# Patient Record
Sex: MALE | Race: OTHER | NOT HISPANIC OR LATINO
[De-identification: names, ages, dates, MRNs, and addresses within clinical notes are randomized per-mention and may not be internally consistent; named-entity substitution may affect disease eponyms.]

---

## 2019-07-08 ENCOUNTER — APPOINTMENT (OUTPATIENT)
Dept: HEART AND VASCULAR | Facility: CLINIC | Age: 59
End: 2019-07-08
Payer: COMMERCIAL

## 2019-07-08 VITALS
TEMPERATURE: 98.7 F | SYSTOLIC BLOOD PRESSURE: 130 MMHG | WEIGHT: 189 LBS | HEIGHT: 71 IN | HEART RATE: 80 BPM | BODY MASS INDEX: 26.46 KG/M2 | RESPIRATION RATE: 14 BRPM | DIASTOLIC BLOOD PRESSURE: 70 MMHG | OXYGEN SATURATION: 97 %

## 2019-07-08 DIAGNOSIS — Z80.8 FAMILY HISTORY OF MALIGNANT NEOPLASM OF OTHER ORGANS OR SYSTEMS: ICD-10-CM

## 2019-07-08 DIAGNOSIS — N20.0 CALCULUS OF KIDNEY: ICD-10-CM

## 2019-07-08 DIAGNOSIS — Z83.3 FAMILY HISTORY OF DIABETES MELLITUS: ICD-10-CM

## 2019-07-08 PROCEDURE — 93306 TTE W/DOPPLER COMPLETE: CPT

## 2019-07-08 PROCEDURE — 99203 OFFICE O/P NEW LOW 30 MIN: CPT

## 2019-07-08 PROCEDURE — 93000 ELECTROCARDIOGRAM COMPLETE: CPT

## 2019-07-08 NOTE — DISCUSSION/SUMMARY
[PVCs] : ectopic ventricular beats [Echocardiogram] : an echocardiogram [___ Day(s)] : [unfilled] day(s) [With Me] : with me [FreeTextEntry1] : echocardiogram results reviewed with patient: normal  LVEF and wall motion without significant valvular pathology  or evidence of pulmonary hypertension \par \par Advised to have holter monitor placed  later this week.  \par \par After holter results returned,  will start magnesium oxide 400mg daily and assess response\par \par Continue to decrease alcohol intake ,  limit 5 drinks per week

## 2019-07-08 NOTE — PHYSICAL EXAM
[General Appearance - Well Developed] : well developed [Normal Appearance] : normal appearance [Well Groomed] : well groomed [General Appearance - Well Nourished] : well nourished [No Deformities] : no deformities [General Appearance - In No Acute Distress] : no acute distress [Normal Conjunctiva] : the conjunctiva exhibited no abnormalities [Eyelids - No Xanthelasma] : the eyelids demonstrated no xanthelasmas [No Oral Cyanosis] : no oral cyanosis [Normal Jugular Venous V Waves Present] : normal jugular venous V waves present [Normal Jugular Venous A Waves Present] : normal jugular venous A waves present [No Jugular Venous Vera A Waves] : no jugular venous vera A waves [Heart Sounds] : normal S1 and S2 [Heart Rate And Rhythm] : heart rate and rhythm were normal [Murmurs] : no murmurs present [Arterial Pulses Normal] : the arterial pulses were normal [Edema] : no peripheral edema present [FreeTextEntry1] : normal capillary refill  [Respiration, Rhythm And Depth] : normal respiratory rhythm and effort [Exaggerated Use Of Accessory Muscles For Inspiration] : no accessory muscle use [Auscultation Breath Sounds / Voice Sounds] : lungs were clear to auscultation bilaterally [Bowel Sounds] : normal bowel sounds [Abdomen Soft] : soft [Abdomen Tenderness] : non-tender [Abdomen Mass (___ Cm)] : no abdominal mass palpated [Abnormal Walk] : normal gait [Gait - Sufficient For Exercise Testing] : the gait was sufficient for exercise testing [Nail Clubbing] : no clubbing of the fingernails [Cyanosis, Localized] : no localized cyanosis [Petechial Hemorrhages (___cm)] : no petechial hemorrhages [] : no ischemic changes [Skin Color & Pigmentation] : normal skin color and pigmentation [Skin Turgor] : normal skin turgor [No Venous Stasis] : no venous stasis [No Xanthoma] : no  xanthoma was observed [Oriented To Time, Place, And Person] : oriented to person, place, and time [Impaired Insight] : insight and judgment were intact [Affect] : the affect was normal [Mood] : the mood was normal

## 2019-07-08 NOTE — HISTORY OF PRESENT ILLNESS
[FreeTextEntry1] : EKG today shows NSR 79 bpm  with frequent VPCs conducting with LBBB morphology , varying coupling intervals . No ischemia or LVH . Normal axis and intervals \par \par Drinks at least 10 drinks per week,  does not hydrate well  most days \par \par Recent lipid status  in good order , LFTs normal \par \par \par \par

## 2019-07-08 NOTE — REASON FOR VISIT
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 Stopped Claritin D recently  and denies use of stimulants or excessive caffeine intake . No c/o chest pain, syncope, orthopnea, PND, edema . No  family hx of premature CAD.  \par \par Non smoker \par \par

## 2019-07-08 NOTE — REVIEW OF SYSTEMS
[Palpitations] : palpitations [Confusion] : no confusion was observed [Memory Lapses Or Loss] : no memory lapses or loss [Depression] : no depression [Anxiety] : anxiety [Under Stress] : under stress [Suicidal] : not suicidal [Negative] : Heme/Lymph

## 2019-07-08 NOTE — PHYSICAL EXAM
[General Appearance - Well Developed] : well developed [Normal Appearance] : normal appearance [Well Groomed] : well groomed [General Appearance - Well Nourished] : well nourished [No Deformities] : no deformities [General Appearance - In No Acute Distress] : no acute distress [Normal Conjunctiva] : the conjunctiva exhibited no abnormalities [Eyelids - No Xanthelasma] : the eyelids demonstrated no xanthelasmas [No Oral Cyanosis] : no oral cyanosis [Normal Jugular Venous V Waves Present] : normal jugular venous V waves present [Normal Jugular Venous A Waves Present] : normal jugular venous A waves present [No Jugular Venous Vera A Waves] : no jugular venous vera A waves [Heart Rate And Rhythm] : heart rate and rhythm were normal [Heart Sounds] : normal S1 and S2 [Murmurs] : no murmurs present [Arterial Pulses Normal] : the arterial pulses were normal [Edema] : no peripheral edema present [FreeTextEntry1] : normal capillary refill  [Respiration, Rhythm And Depth] : normal respiratory rhythm and effort [Exaggerated Use Of Accessory Muscles For Inspiration] : no accessory muscle use [Auscultation Breath Sounds / Voice Sounds] : lungs were clear to auscultation bilaterally [Bowel Sounds] : normal bowel sounds [Abdomen Soft] : soft [Abdomen Tenderness] : non-tender [Abdomen Mass (___ Cm)] : no abdominal mass palpated [Abnormal Walk] : normal gait [Gait - Sufficient For Exercise Testing] : the gait was sufficient for exercise testing [Nail Clubbing] : no clubbing of the fingernails [Cyanosis, Localized] : no localized cyanosis [Petechial Hemorrhages (___cm)] : no petechial hemorrhages [] : no ischemic changes [Skin Color & Pigmentation] : normal skin color and pigmentation [Skin Turgor] : normal skin turgor [No Venous Stasis] : no venous stasis [No Xanthoma] : no  xanthoma was observed [Oriented To Time, Place, And Person] : oriented to person, place, and time [Impaired Insight] : insight and judgment were intact [Affect] : the affect was normal [Mood] : the mood was normal

## 2019-07-10 ENCOUNTER — APPOINTMENT (OUTPATIENT)
Dept: HEART AND VASCULAR | Facility: CLINIC | Age: 59
End: 2019-07-10
Payer: COMMERCIAL

## 2019-07-10 VITALS
DIASTOLIC BLOOD PRESSURE: 72 MMHG | BODY MASS INDEX: 26.88 KG/M2 | RESPIRATION RATE: 14 BRPM | HEIGHT: 71 IN | WEIGHT: 192 LBS | OXYGEN SATURATION: 96 % | TEMPERATURE: 98.6 F | HEART RATE: 86 BPM | SYSTOLIC BLOOD PRESSURE: 120 MMHG

## 2019-07-10 DIAGNOSIS — F41.9 ANXIETY DISORDER, UNSPECIFIED: ICD-10-CM

## 2019-07-10 PROCEDURE — 93224 XTRNL ECG REC UP TO 48 HRS: CPT

## 2019-07-10 RX ORDER — ALPRAZOLAM 0.25 MG/1
0.25 TABLET ORAL TWICE DAILY
Qty: 20 | Refills: 0 | Status: ACTIVE | COMMUNITY
Start: 2019-07-10 | End: 1900-01-01

## 2019-08-07 ENCOUNTER — APPOINTMENT (OUTPATIENT)
Dept: HEART AND VASCULAR | Facility: CLINIC | Age: 59
End: 2019-08-07
Payer: COMMERCIAL

## 2019-08-07 DIAGNOSIS — I49.3 VENTRICULAR PREMATURE DEPOLARIZATION: ICD-10-CM

## 2019-08-07 PROCEDURE — 93351 STRESS TTE COMPLETE: CPT

## 2020-03-05 ENCOUNTER — FORM ENCOUNTER (OUTPATIENT)
Age: 60
End: 2020-03-05

## 2020-03-06 ENCOUNTER — OUTPATIENT (OUTPATIENT)
Dept: OUTPATIENT SERVICES | Facility: HOSPITAL | Age: 60
LOS: 1 days | End: 2020-03-06
Payer: COMMERCIAL

## 2020-03-06 ENCOUNTER — APPOINTMENT (OUTPATIENT)
Dept: HEART AND VASCULAR | Facility: CLINIC | Age: 60
End: 2020-03-06
Payer: COMMERCIAL

## 2020-03-06 VITALS
DIASTOLIC BLOOD PRESSURE: 88 MMHG | SYSTOLIC BLOOD PRESSURE: 124 MMHG | WEIGHT: 195 LBS | HEIGHT: 71 IN | BODY MASS INDEX: 27.3 KG/M2 | OXYGEN SATURATION: 99 % | RESPIRATION RATE: 16 BRPM | HEART RATE: 62 BPM | TEMPERATURE: 97.6 F

## 2020-03-06 DIAGNOSIS — R00.1 BRADYCARDIA, UNSPECIFIED: ICD-10-CM

## 2020-03-06 DIAGNOSIS — I49.3 VENTRICULAR PREMATURE DEPOLARIZATION: ICD-10-CM

## 2020-03-06 PROCEDURE — 71046 X-RAY EXAM CHEST 2 VIEWS: CPT

## 2020-03-06 PROCEDURE — 99213 OFFICE O/P EST LOW 20 MIN: CPT

## 2020-03-06 PROCEDURE — 36415 COLL VENOUS BLD VENIPUNCTURE: CPT

## 2020-03-06 PROCEDURE — 71046 X-RAY EXAM CHEST 2 VIEWS: CPT | Mod: 26

## 2020-03-06 PROCEDURE — 93000 ELECTROCARDIOGRAM COMPLETE: CPT | Mod: NC

## 2020-03-07 PROBLEM — I49.3 PREMATURE VENTRICULAR CONTRACTION: Status: ACTIVE | Noted: 2019-07-08

## 2020-03-07 PROBLEM — R00.1 SINUS BRADYCARDIA: Status: ACTIVE | Noted: 2020-03-07

## 2020-03-07 LAB
ALBUMIN SERPL ELPH-MCNC: 4.6 G/DL
ALP BLD-CCNC: 68 U/L
ALT SERPL-CCNC: 21 U/L
ANION GAP SERPL CALC-SCNC: 16 MMOL/L
APPEARANCE: CLEAR
APTT BLD: 33.4 SEC
AST SERPL-CCNC: 17 U/L
BASOPHILS # BLD AUTO: 0.05 K/UL
BASOPHILS NFR BLD AUTO: 0.7 %
BILIRUB SERPL-MCNC: 1.1 MG/DL
BILIRUBIN URINE: NEGATIVE
BLOOD URINE: NEGATIVE
BUN SERPL-MCNC: 14 MG/DL
CALCIUM SERPL-MCNC: 9.9 MG/DL
CHLORIDE SERPL-SCNC: 100 MMOL/L
CHOLEST SERPL-MCNC: 182 MG/DL
CHOLEST/HDLC SERPL: 3.3 RATIO
CO2 SERPL-SCNC: 23 MMOL/L
COLOR: NORMAL
CREAT SERPL-MCNC: 1.01 MG/DL
EOSINOPHIL # BLD AUTO: 0.13 K/UL
EOSINOPHIL NFR BLD AUTO: 1.8 %
ESTIMATED AVERAGE GLUCOSE: 108 MG/DL
GLUCOSE QUALITATIVE U: NEGATIVE
GLUCOSE SERPL-MCNC: 83 MG/DL
HBA1C MFR BLD HPLC: 5.4 %
HCT VFR BLD CALC: 48.1 %
HDLC SERPL-MCNC: 55 MG/DL
HGB BLD-MCNC: 15.2 G/DL
IMM GRANULOCYTES NFR BLD AUTO: 0.3 %
INR PPP: 0.92 RATIO
KETONES URINE: NEGATIVE
LDLC SERPL CALC-MCNC: 88 MG/DL
LEUKOCYTE ESTERASE URINE: NEGATIVE
LYMPHOCYTES # BLD AUTO: 1.75 K/UL
LYMPHOCYTES NFR BLD AUTO: 24.6 %
MAN DIFF?: NORMAL
MCHC RBC-ENTMCNC: 30.7 PG
MCHC RBC-ENTMCNC: 31.6 GM/DL
MCV RBC AUTO: 97.2 FL
MONOCYTES # BLD AUTO: 0.72 K/UL
MONOCYTES NFR BLD AUTO: 10.1 %
NEUTROPHILS # BLD AUTO: 4.44 K/UL
NEUTROPHILS NFR BLD AUTO: 62.5 %
NITRITE URINE: NEGATIVE
PH URINE: 7
PLATELET # BLD AUTO: 238 K/UL
POTASSIUM SERPL-SCNC: 4.4 MMOL/L
PROT SERPL-MCNC: 6.8 G/DL
PROTEIN URINE: NEGATIVE
PT BLD: 10.6 SEC
RBC # BLD: 4.95 M/UL
RBC # FLD: 12.6 %
SODIUM SERPL-SCNC: 139 MMOL/L
SPECIFIC GRAVITY URINE: 1.01
TRIGL SERPL-MCNC: 196 MG/DL
TSH SERPL-ACNC: 1.65 UIU/ML
UROBILINOGEN URINE: NORMAL
WBC # FLD AUTO: 7.11 K/UL

## 2020-03-07 NOTE — HISTORY OF PRESENT ILLNESS
[Preoperative Visit] : for a medical evaluation prior to surgery [Scheduled Procedure ___] : a [unfilled] [Date of Surgery ___] : on [unfilled] [Surgeon Name ___] : surgeon: [unfilled] [Fever] : no fever [Chills] : no chills [Fatigue] : no fatigue [Chest Pain] : no chest pain [Cough] : no cough [Dyspnea] : no dyspnea [Dysuria] : no dysuria [Urinary Frequency] : no urinary frequency [Nausea] : no nausea [Vomiting] : no vomiting [Diarrhea] : no diarrhea [Abdominal Pain] : no abdominal pain [Easy Bruising] : no easy bruising [Lower Extremity Swelling] : no lower extremity swelling [Poor Exercise Tolerance] : no poor exercise tolerance [Diabetes] : no diabetes [Cardiovascular Disease] : no cardiovascular disease [Pulmonary Disease] : no pulmonary disease [Anti-Platelet Agents] : no anti-platelet agents [Nicotine Dependence] : no nicotine dependence [Alcohol Use] : alcohol use [Renal Disease] : no renal disease [GI Disease] : no gastrointestinal disease [Sleep Apnea] : no sleep apnea [Thromboembolic Problems] : no thromboembolic problems [Frequent use of NSAIDs] : no use of NSAIDs [Transfusion Reaction] : no transfusion reaction [Impaired Immunity] : no impaired immunity [Steroid Use in Last 6 Months] : no steroid use in the last six months [Frequent Aspirin Use] : no frequent aspirin use [Prior Anesthesia] : Prior anesthesia [Prev Anesthesia Reaction] : no previous anesthesia reaction [Electrocardiogram] : ~T an ECG ~C was performed [Echocardiogram] : ~T an echocardiogram ~C was performed [Cardiovascular Stress Test] : a cardiac stress test ~T ~C was performed [Good] : Good [Anesthesia Reaction] : no anesthesia reaction [Sudden Death] : no sudden death [Clotting Disorder] : no clotting disorder [Bleeding Disorder] : no bleeding disorder [FreeTextEntry1] : 60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  He has excellent exercise capacity with normal stress echocardiogram and is responding to  metoprolol . \par \par There is no hx of MI, CHF, syncope, strokes , asthma \par \par S/p recent echocardiogram, large gallstones were identified .  He has mild gastritis on EGD . He does have symptoms of post prandial bloating and abdominal /upper back discomfort   but no documented pancreatitis . \par \par EKG shows sinus bradycardia 58 bpm without ectopy, ischemia or LVH

## 2020-03-07 NOTE — PHYSICAL EXAM
[General Appearance - Well Developed] : well developed [Normal Appearance] : normal appearance [Well Groomed] : well groomed [General Appearance - Well Nourished] : well nourished [No Deformities] : no deformities [General Appearance - In No Acute Distress] : no acute distress [Normal Conjunctiva] : the conjunctiva exhibited no abnormalities [Eyelids - No Xanthelasma] : the eyelids demonstrated no xanthelasmas [No Oral Cyanosis] : no oral cyanosis [Normal Jugular Venous A Waves Present] : normal jugular venous A waves present [Normal Jugular Venous V Waves Present] : normal jugular venous V waves present [No Jugular Venous Vera A Waves] : no jugular venous vera A waves [Respiration, Rhythm And Depth] : normal respiratory rhythm and effort [Exaggerated Use Of Accessory Muscles For Inspiration] : no accessory muscle use [Auscultation Breath Sounds / Voice Sounds] : lungs were clear to auscultation bilaterally [Heart Rate And Rhythm] : heart rate and rhythm were normal [Heart Sounds] : normal S1 and S2 [Murmurs] : no murmurs present [Arterial Pulses Normal] : the arterial pulses were normal [Edema] : no peripheral edema present [FreeTextEntry1] : normal capillary refill  [Abnormal Walk] : normal gait [Gait - Sufficient For Exercise Testing] : the gait was sufficient for exercise testing [Nail Clubbing] : no clubbing of the fingernails [Cyanosis, Localized] : no localized cyanosis [Petechial Hemorrhages (___cm)] : no petechial hemorrhages [] : no ischemic changes [Skin Color & Pigmentation] : normal skin color and pigmentation [Skin Turgor] : normal skin turgor [No Venous Stasis] : no venous stasis [No Xanthoma] : no  xanthoma was observed [Oriented To Time, Place, And Person] : oriented to person, place, and time [Impaired Insight] : insight and judgment were intact [Affect] : the affect was normal [Mood] : the mood was normal

## 2020-06-10 ENCOUNTER — RX RENEWAL (OUTPATIENT)
Age: 60
End: 2020-06-10

## 2021-03-07 ENCOUNTER — RX RENEWAL (OUTPATIENT)
Age: 61
End: 2021-03-07

## 2021-05-05 ENCOUNTER — APPOINTMENT (OUTPATIENT)
Dept: HEART AND VASCULAR | Facility: CLINIC | Age: 61
End: 2021-05-05
Payer: COMMERCIAL

## 2021-05-05 VITALS
OXYGEN SATURATION: 97 % | BODY MASS INDEX: 26.18 KG/M2 | SYSTOLIC BLOOD PRESSURE: 120 MMHG | TEMPERATURE: 97.1 F | WEIGHT: 187 LBS | HEART RATE: 69 BPM | RESPIRATION RATE: 15 BRPM | DIASTOLIC BLOOD PRESSURE: 72 MMHG | HEIGHT: 71 IN

## 2021-05-05 DIAGNOSIS — E78.1 PURE HYPERGLYCERIDEMIA: ICD-10-CM

## 2021-05-05 DIAGNOSIS — Z00.00 ENCOUNTER FOR GENERAL ADULT MEDICAL EXAMINATION W/OUT ABNORMAL FINDINGS: ICD-10-CM

## 2021-05-05 DIAGNOSIS — R00.2 PALPITATIONS: ICD-10-CM

## 2021-05-05 PROCEDURE — 93000 ELECTROCARDIOGRAM COMPLETE: CPT

## 2021-05-05 PROCEDURE — 36415 COLL VENOUS BLD VENIPUNCTURE: CPT

## 2021-05-05 PROCEDURE — 99072 ADDL SUPL MATRL&STAF TM PHE: CPT

## 2021-05-05 PROCEDURE — 99396 PREV VISIT EST AGE 40-64: CPT

## 2021-05-05 RX ORDER — ZOSTER VACCINE RECOMBINANT, ADJUVANTED 50 MCG/0.5
50 KIT INTRAMUSCULAR
Qty: 1 | Refills: 1 | Status: ACTIVE | COMMUNITY
Start: 2021-05-05 | End: 1900-01-01

## 2021-05-06 LAB
25(OH)D3 SERPL-MCNC: 23.7 NG/ML
ALBUMIN SERPL ELPH-MCNC: 4.6 G/DL
ALP BLD-CCNC: 76 U/L
ALT SERPL-CCNC: 16 U/L
ANION GAP SERPL CALC-SCNC: 11 MMOL/L
APPEARANCE: CLEAR
AST SERPL-CCNC: 18 U/L
BASOPHILS # BLD AUTO: 0.02 K/UL
BASOPHILS NFR BLD AUTO: 0.3 %
BILIRUB SERPL-MCNC: 1 MG/DL
BILIRUBIN URINE: NEGATIVE
BLOOD URINE: NEGATIVE
BUN SERPL-MCNC: 14 MG/DL
CALCIUM SERPL-MCNC: 9.7 MG/DL
CHLORIDE SERPL-SCNC: 105 MMOL/L
CHOLEST SERPL-MCNC: 202 MG/DL
CO2 SERPL-SCNC: 24 MMOL/L
COLOR: NORMAL
COVID-19 NUCLEOCAPSID  GAM ANTIBODY INTERPRETATION: NEGATIVE
COVID-19 SPIKE DOMAIN ANTIBODY INTERPRETATION: POSITIVE
CREAT SERPL-MCNC: 0.98 MG/DL
CREAT SPEC-SCNC: 149 MG/DL
EOSINOPHIL # BLD AUTO: 0.1 K/UL
EOSINOPHIL NFR BLD AUTO: 1.4 %
ESTIMATED AVERAGE GLUCOSE: 108 MG/DL
GLUCOSE QUALITATIVE U: NEGATIVE
GLUCOSE SERPL-MCNC: 89 MG/DL
HBA1C MFR BLD HPLC: 5.4 %
HCT VFR BLD CALC: 50.2 %
HDLC SERPL-MCNC: 62 MG/DL
HGB BLD-MCNC: 15.7 G/DL
IMM GRANULOCYTES NFR BLD AUTO: 0.4 %
KETONES URINE: NEGATIVE
LDLC SERPL CALC-MCNC: 116 MG/DL
LEUKOCYTE ESTERASE URINE: NEGATIVE
LYMPHOCYTES # BLD AUTO: 1.62 K/UL
LYMPHOCYTES NFR BLD AUTO: 23.1 %
MAN DIFF?: NORMAL
MCHC RBC-ENTMCNC: 29.5 PG
MCHC RBC-ENTMCNC: 31.3 GM/DL
MCV RBC AUTO: 94.2 FL
MICROALBUMIN 24H UR DL<=1MG/L-MCNC: <1.2 MG/DL
MICROALBUMIN/CREAT 24H UR-RTO: NORMAL MG/G
MONOCYTES # BLD AUTO: 0.71 K/UL
MONOCYTES NFR BLD AUTO: 10.1 %
NEUTROPHILS # BLD AUTO: 4.54 K/UL
NEUTROPHILS NFR BLD AUTO: 64.7 %
NITRITE URINE: NEGATIVE
NONHDLC SERPL-MCNC: 140 MG/DL
PH URINE: 5.5
PLATELET # BLD AUTO: 246 K/UL
POTASSIUM SERPL-SCNC: 4.6 MMOL/L
PROT SERPL-MCNC: 7.1 G/DL
PROTEIN URINE: NEGATIVE
PSA FREE FLD-MCNC: 21 %
PSA FREE SERPL-MCNC: 0.39 NG/ML
PSA SERPL-MCNC: 1.84 NG/ML
RBC # BLD: 5.33 M/UL
RBC # FLD: 12.6 %
SARS-COV-2 AB SERPL IA-ACNC: >250 U/ML
SARS-COV-2 AB SERPL QL IA: 0.08 INDEX
SODIUM SERPL-SCNC: 140 MMOL/L
SPECIFIC GRAVITY URINE: 1.02
TRIGL SERPL-MCNC: 124 MG/DL
TSH SERPL-ACNC: 1.37 UIU/ML
UROBILINOGEN URINE: NORMAL
WBC # FLD AUTO: 7.02 K/UL

## 2021-05-25 PROBLEM — Z00.00 ANNUAL PHYSICAL EXAM: Status: ACTIVE | Noted: 2021-05-25

## 2021-05-25 NOTE — PLAN
[FreeTextEntry1] : venipuncture performed with Ciovid Ab, PSA,  lipids,  vitamin D ,etc\par \par advise  robotic assisted cholecystectomy \par \par rx for Shingrix provided \par \par encouraged regular activity and   sensible, low fat diet

## 2021-05-25 NOTE — HEALTH RISK ASSESSMENT
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

## 2021-05-25 NOTE — REVIEW OF SYSTEMS
[Recent Change In Weight] : ~T recent weight change [Anxiety] : anxiety [Negative] : Heme/Lymph [FreeTextEntry2] : 8 lb weight loss on diet

## 2021-05-25 NOTE — HISTORY OF PRESENT ILLNESS
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   He has completed  two doses of Moderna covid vaccine.   No personal hx of covid infection. Past year was very stressful with his wife suffering from endocarditis complicated by strokes and requiring  heart surgery for debridement of valve and prolonged antibiotics. \par \par He is eating a high fiber diet and has not had flare ups of cholecystitis . Prefers to wait until end of summer before having  gallbladder surgery. \par \par EKG shows  NSR 63 bpm without ectopy, ischemia or LVH   QTc 413 msec

## 2021-05-25 NOTE — PHYSICAL EXAM
[No Acute Distress] : no acute distress [Well Nourished] : well nourished [Well Developed] : well developed [Well-Appearing] : well-appearing [Normal Sclera/Conjunctiva] : normal sclera/conjunctiva [PERRL] : pupils equal round and reactive to light [EOMI] : extraocular movements intact [Normal Outer Ear/Nose] : the outer ears and nose were normal in appearance [Normal TMs] : both tympanic membranes were normal [No JVD] : no jugular venous distention [No Lymphadenopathy] : no lymphadenopathy [Supple] : supple [Thyroid Normal, No Nodules] : the thyroid was normal and there were no nodules present [No Respiratory Distress] : no respiratory distress  [No Accessory Muscle Use] : no accessory muscle use [Clear to Auscultation] : lungs were clear to auscultation bilaterally [Normal Rate] : normal rate  [Regular Rhythm] : with a regular rhythm [Normal S1, S2] : normal S1 and S2 [No Murmur] : no murmur heard [No Carotid Bruits] : no carotid bruits [No Abdominal Bruit] : a ~M bruit was not heard ~T in the abdomen [No Varicosities] : no varicosities [Pedal Pulses Present] : the pedal pulses are present [No Edema] : there was no peripheral edema [No Palpable Aorta] : no palpable aorta [No Extremity Clubbing/Cyanosis] : no extremity clubbing/cyanosis [Soft] : abdomen soft [Non Tender] : non-tender [Non-distended] : non-distended [No Masses] : no abdominal mass palpated [No HSM] : no HSM [Normal Bowel Sounds] : normal bowel sounds [Normal Supraclavicular Nodes] : no supraclavicular lymphadenopathy [Normal Axillary Nodes] : no axillary lymphadenopathy [Normal Posterior Cervical Nodes] : no posterior cervical lymphadenopathy [Normal Anterior Cervical Nodes] : no anterior cervical lymphadenopathy [No CVA Tenderness] : no CVA  tenderness [No Spinal Tenderness] : no spinal tenderness [No Joint Swelling] : no joint swelling [Grossly Normal Strength/Tone] : grossly normal strength/tone [No Rash] : no rash [Coordination Grossly Intact] : coordination grossly intact [No Focal Deficits] : no focal deficits [Normal Gait] : normal gait [Deep Tendon Reflexes (DTR)] : deep tendon reflexes were 2+ and symmetric [Speech Grossly Normal] : speech grossly normal [Normal Affect] : the affect was normal [Alert and Oriented x3] : oriented to person, place, and time [Normal Mood] : the mood was normal [Normal Insight/Judgement] : insight and judgment were intact [de-identified] : wearing protective face  mask

## 2021-06-15 DIAGNOSIS — E78.5 HYPERLIPIDEMIA, UNSPECIFIED: ICD-10-CM

## 2021-09-01 ENCOUNTER — APPOINTMENT (OUTPATIENT)
Dept: HEART AND VASCULAR | Facility: CLINIC | Age: 61
End: 2021-09-01
Payer: COMMERCIAL

## 2021-09-01 ENCOUNTER — OUTPATIENT (OUTPATIENT)
Dept: OUTPATIENT SERVICES | Facility: HOSPITAL | Age: 61
LOS: 1 days | End: 2021-09-01
Payer: COMMERCIAL

## 2021-09-01 ENCOUNTER — APPOINTMENT (OUTPATIENT)
Dept: ULTRASOUND IMAGING | Facility: HOSPITAL | Age: 61
End: 2021-09-01
Payer: COMMERCIAL

## 2021-09-01 VITALS
DIASTOLIC BLOOD PRESSURE: 82 MMHG | WEIGHT: 186 LBS | TEMPERATURE: 97.6 F | HEART RATE: 65 BPM | BODY MASS INDEX: 26.04 KG/M2 | RESPIRATION RATE: 15 BRPM | SYSTOLIC BLOOD PRESSURE: 130 MMHG | OXYGEN SATURATION: 98 % | HEIGHT: 71 IN

## 2021-09-01 DIAGNOSIS — R79.1 ABNORMAL COAGULATION PROFILE: ICD-10-CM

## 2021-09-01 DIAGNOSIS — K80.20 CALCULUS OF GALLBLADDER W/OUT CHOLECYSTITIS W/OUT OBSTRUCTION: ICD-10-CM

## 2021-09-01 PROCEDURE — 36415 COLL VENOUS BLD VENIPUNCTURE: CPT

## 2021-09-01 PROCEDURE — 99213 OFFICE O/P EST LOW 20 MIN: CPT | Mod: 57

## 2021-09-01 PROCEDURE — 93000 ELECTROCARDIOGRAM COMPLETE: CPT | Mod: NC

## 2021-09-01 PROCEDURE — 71046 X-RAY EXAM CHEST 2 VIEWS: CPT | Mod: 26

## 2021-09-01 PROCEDURE — 76705 ECHO EXAM OF ABDOMEN: CPT

## 2021-09-01 PROCEDURE — 76705 ECHO EXAM OF ABDOMEN: CPT | Mod: 26

## 2021-09-01 PROCEDURE — 71046 X-RAY EXAM CHEST 2 VIEWS: CPT

## 2021-09-02 PROBLEM — R79.1 ELEVATED PARTIAL THROMBOPLASTIN TIME (PTT): Status: ACTIVE | Noted: 2021-09-02

## 2021-09-02 LAB
ALBUMIN SERPL ELPH-MCNC: 4.3 G/DL
ALP BLD-CCNC: 76 U/L
ALT SERPL-CCNC: 23 U/L
ANION GAP SERPL CALC-SCNC: 14 MMOL/L
APPEARANCE: CLEAR
APTT BLD: 42.1 SEC
AST SERPL-CCNC: 20 U/L
BASOPHILS # BLD AUTO: 0.02 K/UL
BASOPHILS NFR BLD AUTO: 0.3 %
BILIRUB SERPL-MCNC: 0.7 MG/DL
BILIRUBIN URINE: NEGATIVE
BLOOD URINE: NEGATIVE
BUN SERPL-MCNC: 16 MG/DL
CALCIUM SERPL-MCNC: 10 MG/DL
CHLORIDE SERPL-SCNC: 105 MMOL/L
CO2 SERPL-SCNC: 22 MMOL/L
COLOR: NORMAL
CREAT SERPL-MCNC: 0.88 MG/DL
EOSINOPHIL # BLD AUTO: 0.2 K/UL
EOSINOPHIL NFR BLD AUTO: 2.7 %
GLUCOSE QUALITATIVE U: NEGATIVE
GLUCOSE SERPL-MCNC: 108 MG/DL
HCT VFR BLD CALC: 48.6 %
HGB BLD-MCNC: 15.6 G/DL
IMM GRANULOCYTES NFR BLD AUTO: 0.4 %
INR PPP: 0.93 RATIO
KETONES URINE: NEGATIVE
LEUKOCYTE ESTERASE URINE: NEGATIVE
LYMPHOCYTES # BLD AUTO: 1.41 K/UL
LYMPHOCYTES NFR BLD AUTO: 18.9 %
MAN DIFF?: NORMAL
MCHC RBC-ENTMCNC: 30.5 PG
MCHC RBC-ENTMCNC: 32.1 GM/DL
MCV RBC AUTO: 94.9 FL
MONOCYTES # BLD AUTO: 0.85 K/UL
MONOCYTES NFR BLD AUTO: 11.4 %
NEUTROPHILS # BLD AUTO: 4.97 K/UL
NEUTROPHILS NFR BLD AUTO: 66.3 %
NITRITE URINE: NEGATIVE
PH URINE: 7
PLATELET # BLD AUTO: 250 K/UL
POTASSIUM SERPL-SCNC: 4.3 MMOL/L
PROT SERPL-MCNC: 6.9 G/DL
PROTEIN URINE: NEGATIVE
PT BLD: 11 SEC
RBC # BLD: 5.12 M/UL
RBC # FLD: 12.4 %
SODIUM SERPL-SCNC: 141 MMOL/L
SPECIFIC GRAVITY URINE: 1.02
UROBILINOGEN URINE: NORMAL
WBC # FLD AUTO: 7.48 K/UL

## 2021-09-07 ENCOUNTER — TRANSCRIPTION ENCOUNTER (OUTPATIENT)
Age: 61
End: 2021-09-07

## 2021-09-15 NOTE — DISCUSSION/SUMMARY
[Procedure Low Risk] : the procedure risk is low [Patient Low Risk] : the patient is a low surgical risk [Optimized for Surgery] : the patient is optimized for surgery [Continue] : Continue medications as currently directed [FreeTextEntry1] : Mildly elevated APTT   but lupus anticoagulant and mixing studies are normal . No hx of  bleeding issues or clotting problems

## 2021-09-15 NOTE — HISTORY OF PRESENT ILLNESS
[Preoperative Visit] : for a medical evaluation prior to surgery [Scheduled Procedure ___] : a [unfilled] [Date of Surgery ___] : on [unfilled] [Surgeon Name ___] : surgeon: [unfilled] [Cardiovascular Disease] : cardiovascular disease [Prior Anesthesia] : Prior anesthesia [Electrocardiogram] : ~T an ECG ~C was performed [Echocardiogram] : ~T an echocardiogram ~C was performed [Good] : Good [Cardiovascular Stress Test] : a cardiac stress test ~T ~C was performed [Fever] : no fever [Chills] : no chills [Fatigue] : no fatigue [Chest Pain] : no chest pain [Cough] : no cough [Dyspnea] : no dyspnea [Dysuria] : no dysuria [Urinary Frequency] : no urinary frequency [Nausea] : no nausea [Vomiting] : no vomiting [Diarrhea] : no diarrhea [Abdominal Pain] : no abdominal pain [Easy Bruising] : no easy bruising [Lower Extremity Swelling] : no lower extremity swelling [Poor Exercise Tolerance] : no poor exercise tolerance [Diabetes] : no diabetes [Pulmonary Disease] : no pulmonary disease [Anti-Platelet Agents] : no anti-platelet agents [Nicotine Dependence] : no nicotine dependence [Alcohol Use] : no  alcohol use [Renal Disease] : no renal disease [GI Disease] : no gastrointestinal disease [Sleep Apnea] : no sleep apnea [Thromboembolic Problems] : no thromboembolic problems [Frequent use of NSAIDs] : no use of NSAIDs [Transfusion Reaction] : no transfusion reaction [Impaired Immunity] : no impaired immunity [Frequent Aspirin Use] : no frequent aspirin use [Prev Anesthesia Reaction] : no previous anesthesia reaction [Anesthesia Reaction] : no anesthesia reaction [Sudden Death] : no sudden death [Clotting Disorder] : no clotting disorder [Bleeding Disorder] : no bleeding disorder [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  He has excellent exercise capacity with normal stress echocardiogram and is responding to  metoprolol . \par \par There is no hx of MI, CHF, syncope, strokes , asthma \par \par S/p recent echocardiogram, large gallstones were identified .  He has mild gastritis on EGD . He dis  have symptoms of post prandial bloating and abdominal /upper back discomfort   but no documented pancreatitis . \par \par EKG shows sinus rhythm  63 bpm  without ectopy, ischemia or LVH \par \par Had recent Moderna covid booster

## 2021-09-15 NOTE — PHYSICAL EXAM
[General Appearance - Well Developed] : well developed [Normal Appearance] : normal appearance [Well Groomed] : well groomed [General Appearance - Well Nourished] : well nourished [No Deformities] : no deformities [General Appearance - In No Acute Distress] : no acute distress [Normal Conjunctiva] : the conjunctiva exhibited no abnormalities [Eyelids - No Xanthelasma] : the eyelids demonstrated no xanthelasmas [No Oral Cyanosis] : no oral cyanosis [Normal Jugular Venous A Waves Present] : normal jugular venous A waves present [Normal Jugular Venous V Waves Present] : normal jugular venous V waves present [No Jugular Venous Vera A Waves] : no jugular venous vera A waves [Respiration, Rhythm And Depth] : normal respiratory rhythm and effort [Exaggerated Use Of Accessory Muscles For Inspiration] : no accessory muscle use [Auscultation Breath Sounds / Voice Sounds] : lungs were clear to auscultation bilaterally [Heart Sounds] : normal S1 and S2 [Heart Rate And Rhythm] : heart rate and rhythm were normal [Murmurs] : no murmurs present [Arterial Pulses Normal] : the arterial pulses were normal [Edema] : no peripheral edema present [Bowel Sounds] : normal bowel sounds [Abdomen Tenderness] : non-tender [Abnormal Walk] : normal gait [Gait - Sufficient For Exercise Testing] : the gait was sufficient for exercise testing [Nail Clubbing] : no clubbing of the fingernails [Cyanosis, Localized] : no localized cyanosis [Petechial Hemorrhages (___cm)] : no petechial hemorrhages [] : no ischemic changes [Skin Color & Pigmentation] : normal skin color and pigmentation [Skin Turgor] : normal skin turgor [No Venous Stasis] : no venous stasis [No Xanthoma] : no  xanthoma was observed [Impaired Insight] : insight and judgment were intact [Oriented To Time, Place, And Person] : oriented to person, place, and time [Affect] : the affect was normal [Mood] : the mood was normal [No Anxiety] : not feeling anxious [FreeTextEntry1] : normal capillary refill

## 2021-09-22 ENCOUNTER — TRANSCRIPTION ENCOUNTER (OUTPATIENT)
Age: 61
End: 2021-09-22

## 2021-09-23 ENCOUNTER — OUTPATIENT (OUTPATIENT)
Dept: OUTPATIENT SERVICES | Facility: HOSPITAL | Age: 61
LOS: 1 days | Discharge: HOME | End: 2021-09-23
Payer: COMMERCIAL

## 2021-09-23 ENCOUNTER — RESULT REVIEW (OUTPATIENT)
Age: 61
End: 2021-09-23

## 2021-09-23 PROCEDURE — 88304 TISSUE EXAM BY PATHOLOGIST: CPT | Mod: 26

## 2021-09-23 PROCEDURE — 88300 SURGICAL PATH GROSS: CPT | Mod: 26,59

## 2021-09-23 RX ORDER — DOCUSATE SODIUM 100 MG
2 CAPSULE ORAL
Qty: 56 | Refills: 0
Start: 2021-09-23 | End: 2021-10-06

## 2021-09-23 RX ORDER — OXYCODONE AND ACETAMINOPHEN 5; 325 MG/1; MG/1
1 TABLET ORAL
Qty: 6 | Refills: 0
Start: 2021-09-23

## 2021-09-29 LAB — SURGICAL PATHOLOGY STUDY: SIGNIFICANT CHANGE UP

## 2022-02-02 ENCOUNTER — APPOINTMENT (OUTPATIENT)
Dept: HEART AND VASCULAR | Facility: CLINIC | Age: 62
End: 2022-02-02
Payer: COMMERCIAL

## 2022-02-02 ENCOUNTER — APPOINTMENT (OUTPATIENT)
Dept: HEART AND VASCULAR | Facility: CLINIC | Age: 62
End: 2022-02-02

## 2022-02-02 VITALS
HEIGHT: 71 IN | TEMPERATURE: 97.7 F | SYSTOLIC BLOOD PRESSURE: 101 MMHG | DIASTOLIC BLOOD PRESSURE: 60 MMHG | WEIGHT: 190 LBS | BODY MASS INDEX: 26.6 KG/M2 | HEART RATE: 65 BPM | OXYGEN SATURATION: 97 %

## 2022-02-02 DIAGNOSIS — N52.9 MALE ERECTILE DYSFUNCTION, UNSPECIFIED: ICD-10-CM

## 2022-02-02 DIAGNOSIS — K21.9 GASTRO-ESOPHAGEAL REFLUX DISEASE W/OUT ESOPHAGITIS: ICD-10-CM

## 2022-02-02 DIAGNOSIS — R00.2 PALPITATIONS: ICD-10-CM

## 2022-02-02 DIAGNOSIS — Z01.810 ENCOUNTER FOR PREPROCEDURAL CARDIOVASCULAR EXAMINATION: ICD-10-CM

## 2022-02-02 PROCEDURE — 93880 EXTRACRANIAL BILAT STUDY: CPT

## 2022-02-02 PROCEDURE — 93000 ELECTROCARDIOGRAM COMPLETE: CPT | Mod: NC

## 2022-02-02 PROCEDURE — 99214 OFFICE O/P EST MOD 30 MIN: CPT

## 2022-02-02 PROCEDURE — 36415 COLL VENOUS BLD VENIPUNCTURE: CPT

## 2022-02-05 LAB
ALBUMIN SERPL ELPH-MCNC: 4.5 G/DL
ALP BLD-CCNC: 77 U/L
ALT SERPL-CCNC: 30 U/L
ANION GAP SERPL CALC-SCNC: 11 MMOL/L
APPEARANCE: CLEAR
AST SERPL-CCNC: 24 U/L
BASOPHILS # BLD AUTO: 0.04 K/UL
BASOPHILS NFR BLD AUTO: 0.7 %
BILIRUB SERPL-MCNC: 1.2 MG/DL
BILIRUBIN URINE: NEGATIVE
BLOOD URINE: NEGATIVE
BUN SERPL-MCNC: 16 MG/DL
CALCIUM SERPL-MCNC: 9.9 MG/DL
CHLORIDE SERPL-SCNC: 105 MMOL/L
CHOLEST SERPL-MCNC: 192 MG/DL
CO2 SERPL-SCNC: 24 MMOL/L
COLOR: YELLOW
COVID-19 NUCLEOCAPSID  GAM ANTIBODY INTERPRETATION: NEGATIVE
COVID-19 SPIKE DOMAIN ANTIBODY INTERPRETATION: POSITIVE
CREAT SERPL-MCNC: 0.89 MG/DL
CREAT SPEC-SCNC: 177 MG/DL
EOSINOPHIL # BLD AUTO: 0.14 K/UL
EOSINOPHIL NFR BLD AUTO: 2.5 %
ESTIMATED AVERAGE GLUCOSE: 108 MG/DL
GLUCOSE QUALITATIVE U: NEGATIVE
GLUCOSE SERPL-MCNC: 109 MG/DL
HBA1C MFR BLD HPLC: 5.4 %
HCT VFR BLD CALC: 51.7 %
HDLC SERPL-MCNC: 56 MG/DL
HGB BLD-MCNC: 15.6 G/DL
IMM GRANULOCYTES NFR BLD AUTO: 0.4 %
KETONES URINE: NEGATIVE
LDLC SERPL CALC-MCNC: 96 MG/DL
LEUKOCYTE ESTERASE URINE: NEGATIVE
LYMPHOCYTES # BLD AUTO: 1.2 K/UL
LYMPHOCYTES NFR BLD AUTO: 21.4 %
MAN DIFF?: NORMAL
MCHC RBC-ENTMCNC: 29.6 PG
MCHC RBC-ENTMCNC: 30.2 GM/DL
MCV RBC AUTO: 98.1 FL
MICROALBUMIN 24H UR DL<=1MG/L-MCNC: <1.2 MG/DL
MICROALBUMIN/CREAT 24H UR-RTO: NORMAL MG/G
MONOCYTES # BLD AUTO: 0.67 K/UL
MONOCYTES NFR BLD AUTO: 12 %
NEUTROPHILS # BLD AUTO: 3.53 K/UL
NEUTROPHILS NFR BLD AUTO: 63 %
NITRITE URINE: NEGATIVE
NONHDLC SERPL-MCNC: 136 MG/DL
PH URINE: 5.5
PLATELET # BLD AUTO: 255 K/UL
POTASSIUM SERPL-SCNC: 4.7 MMOL/L
PROT SERPL-MCNC: 7.1 G/DL
PROTEIN URINE: NEGATIVE
RBC # BLD: 5.27 M/UL
RBC # FLD: 13.2 %
SARS-COV-2 AB SERPL IA-ACNC: >250 U/ML
SARS-COV-2 AB SERPL QL IA: 0.08 INDEX
SODIUM SERPL-SCNC: 141 MMOL/L
SPECIFIC GRAVITY URINE: 1.03
TRIGL SERPL-MCNC: 201 MG/DL
TSH SERPL-ACNC: 1.14 UIU/ML
UROBILINOGEN URINE: NORMAL
WBC # FLD AUTO: 5.6 K/UL

## 2022-02-06 PROBLEM — R00.2 PALPITATION: Status: ACTIVE | Noted: 2021-09-01

## 2022-02-06 NOTE — REASON FOR VISIT
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  Stopped Claritin D recently  and denies use of stimulants or excessive caffeine intake .\par \par  No c/o chest pain, syncope, orthopnea, PND, edema . No  family hx of premature CAD.  \par \par Non smoker \par \par

## 2022-02-06 NOTE — REVIEW OF SYSTEMS
[Weight Gain (___ Lbs)] : [unfilled] ~Ulb weight gain [Palpitations] : palpitations [Negative] : Heme/Lymph [FreeTextEntry7] : occasional hemorrhoidal bleeding

## 2022-02-06 NOTE — DISCUSSION/SUMMARY
[Hyperlipidemia] : hyperlipidemia [With Me] : with me [FreeTextEntry1] : carotid duplex scan results reviewed with patient \par \par venipuncture performed \par \par referral for screening colonoscopy provided \par \par Trial tadalafil 10mg  for ED

## 2022-02-06 NOTE — ASSESSMENT
[FreeTextEntry1] : stable hemodynamics \par \par hemorrhoidal bleeding \par \par hypovitaminosis D \par \par ED

## 2022-02-06 NOTE — PHYSICAL EXAM
[Well Developed] : well developed [Well Nourished] : well nourished [No Acute Distress] : no acute distress [Normal Conjunctiva] : normal conjunctiva [No Xanthelasma] : no xanthelasma [Normal Venous Pressure] : normal venous pressure [No Carotid Bruit] : no carotid bruit [Normal S1, S2] : normal S1, S2 [No Murmur] : no murmur [No Rub] : no rub [No Gallop] : no gallop [Clear Lung Fields] : clear lung fields [Good Air Entry] : good air entry [No Respiratory Distress] : no respiratory distress  [Normal Gait] : normal gait [Gait - Sufficient for Exercise Testing] : gait - sufficient for exercise testing [No Edema] : no edema [No Cyanosis] : no cyanosis [No Clubbing] : no clubbing [No Varicosities] : no varicosities [No Rash] : no rash [No Skin Lesions] : no skin lesions [Moves all extremities] : moves all extremities [No Focal Deficits] : no focal deficits [Normal Speech] : normal speech [Alert and Oriented] : alert and oriented [Normal memory] : normal memory [de-identified] : anicteric

## 2022-02-06 NOTE — HISTORY OF PRESENT ILLNESS
[FreeTextEntry1] : Recent exposure to  covid,  he has been fully vaccinated and boosted  \par \par s/p successful laparoscopic cholecystectomy \par \par EKG shows NSR 64 bpm   with LVH  without ectopy, ischemia   or pathologic Q waves \par \par Here for repeat blood work\par \par Needs carotid duplex scan today \par \par No hematuria, chest discomfort, dizziness , syncope \par \par Requires referral for screening colonoscopy \par \par Did not perform recommended  coronary calcium score \par \par Requests  a trial of cialis for ED\par \par

## 2022-05-02 RX ORDER — TADALAFIL 10 MG/1
10 TABLET, FILM COATED ORAL DAILY
Qty: 10 | Refills: 6 | Status: ACTIVE | COMMUNITY
Start: 2022-02-02 | End: 1900-01-01

## 2022-06-15 ENCOUNTER — APPOINTMENT (OUTPATIENT)
Dept: HEART AND VASCULAR | Facility: CLINIC | Age: 62
End: 2022-06-15
Payer: COMMERCIAL

## 2022-06-15 VITALS
TEMPERATURE: 97 F | BODY MASS INDEX: 27.16 KG/M2 | HEIGHT: 71 IN | SYSTOLIC BLOOD PRESSURE: 116 MMHG | DIASTOLIC BLOOD PRESSURE: 74 MMHG | RESPIRATION RATE: 16 BRPM | WEIGHT: 194 LBS | OXYGEN SATURATION: 96 % | HEART RATE: 74 BPM

## 2022-06-15 DIAGNOSIS — I49.3 VENTRICULAR PREMATURE DEPOLARIZATION: ICD-10-CM

## 2022-06-15 DIAGNOSIS — E55.9 VITAMIN D DEFICIENCY, UNSPECIFIED: ICD-10-CM

## 2022-06-15 DIAGNOSIS — E78.5 HYPERLIPIDEMIA, UNSPECIFIED: ICD-10-CM

## 2022-06-15 PROCEDURE — 99213 OFFICE O/P EST LOW 20 MIN: CPT

## 2022-06-15 PROCEDURE — 36415 COLL VENOUS BLD VENIPUNCTURE: CPT

## 2022-06-15 RX ORDER — METOPROLOL SUCCINATE 25 MG/1
25 TABLET, EXTENDED RELEASE ORAL
Qty: 90 | Refills: 2 | Status: ACTIVE | COMMUNITY
Start: 2019-07-15 | End: 1900-01-01

## 2022-06-19 LAB
25(OH)D3 SERPL-MCNC: 24 NG/ML
PSA SERPL-MCNC: 1.71 NG/ML

## 2022-06-26 PROBLEM — I49.3 VENTRICULAR PREMATURE COMPLEXES: Status: ACTIVE | Noted: 2021-09-01

## 2022-06-26 NOTE — REASON FOR VISIT
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  Stopped Claritin D recently  and denies use of stimulants or excessive caffeine intake .\par \par  No c/o chest pain, syncope, orthopnea, PND, edema . No  family hx of premature CAD.  \par \par Non smoker \par \par

## 2022-06-26 NOTE — DISCUSSION/SUMMARY
[FreeTextEntry1] : venipuncture performed in office with lipids, Covid Ab , vitamin D, etc \par \par metoprolol ER 25mg po qd

## 2022-06-26 NOTE — ASSESSMENT
[FreeTextEntry1] : Palpitations suppressed effectively with metoprolol \par \par Mild hypovitaminosis D \par \par ED responsive to tadalafil

## 2022-06-26 NOTE — HISTORY OF PRESENT ILLNESS
[FreeTextEntry1] : Fully vaccinated against covid and boosted as well \par \par Requires refill of medications\par \par Denies palpitations, chest discomfort , dizziness or syncope \par \par Daughter's  baby  born with  tetralogy of Fallot  and underwent \par \par Requires blood tests with covid Ab today \par \par \par

## 2022-06-26 NOTE — PHYSICAL EXAM
[Well Developed] : well developed [Well Nourished] : well nourished [No Acute Distress] : no acute distress [Normal Conjunctiva] : normal conjunctiva [No Xanthelasma] : no xanthelasma [Normal Venous Pressure] : normal venous pressure [No Carotid Bruit] : no carotid bruit [Normal S1, S2] : normal S1, S2 [No Murmur] : no murmur [No Rub] : no rub [No Gallop] : no gallop [Clear Lung Fields] : clear lung fields [Good Air Entry] : good air entry [No Respiratory Distress] : no respiratory distress  [Normal Gait] : normal gait [Gait - Sufficient for Exercise Testing] : gait - sufficient for exercise testing [No Edema] : no edema [No Cyanosis] : no cyanosis [No Clubbing] : no clubbing [No Varicosities] : no varicosities [No Rash] : no rash [No Skin Lesions] : no skin lesions [Moves all extremities] : moves all extremities [No Focal Deficits] : no focal deficits Normal rate, regular rhythm, normal S1, S2 heart sounds heard. [Normal Speech] : normal speech [Alert and Oriented] : alert and oriented [Normal memory] : normal memory [de-identified] : anicteric

## 2022-06-26 NOTE — REVIEW OF SYSTEMS
[Weight Gain (___ Lbs)] : [unfilled] ~Ulb weight gain [Palpitations] : no palpitations [Negative] : Heme/Lymph [FreeTextEntry7] : occasional hemorrhoidal bleeding

## 2023-06-26 ENCOUNTER — APPOINTMENT (OUTPATIENT)
Dept: CT IMAGING | Facility: CLINIC | Age: 63
End: 2023-06-26
Payer: SELF-PAY

## 2023-06-26 ENCOUNTER — OUTPATIENT (OUTPATIENT)
Dept: OUTPATIENT SERVICES | Facility: HOSPITAL | Age: 63
LOS: 1 days | End: 2023-06-26

## 2023-06-26 ENCOUNTER — RESULT REVIEW (OUTPATIENT)
Age: 63
End: 2023-06-26

## 2023-06-26 PROCEDURE — 75571 CT HRT W/O DYE W/CA TEST: CPT | Mod: 26
